# Patient Record
Sex: MALE | Race: WHITE | Employment: FULL TIME | URBAN - METROPOLITAN AREA
[De-identification: names, ages, dates, MRNs, and addresses within clinical notes are randomized per-mention and may not be internally consistent; named-entity substitution may affect disease eponyms.]

---

## 2021-01-15 ENCOUNTER — HOSPITAL ENCOUNTER (EMERGENCY)
Age: 36
Discharge: HOME | End: 2021-01-15
Attending: EMERGENCY MEDICINE
Payer: COMMERCIAL

## 2021-01-15 ENCOUNTER — APPOINTMENT (OUTPATIENT)
Dept: GENERAL RADIOLOGY | Age: 36
End: 2021-01-15
Payer: COMMERCIAL

## 2021-01-15 VITALS
DIASTOLIC BLOOD PRESSURE: 105 MMHG | OXYGEN SATURATION: 97 % | HEIGHT: 75 IN | HEART RATE: 97 BPM | SYSTOLIC BLOOD PRESSURE: 140 MMHG | BODY MASS INDEX: 39.17 KG/M2 | RESPIRATION RATE: 20 BRPM | WEIGHT: 315 LBS | TEMPERATURE: 99.5 F

## 2021-01-15 DIAGNOSIS — U07.1 COVID-19: Primary | ICD-10-CM

## 2021-01-15 DIAGNOSIS — R03.0 ELEVATED BP WITHOUT DIAGNOSIS OF HYPERTENSION: ICD-10-CM

## 2021-01-15 DIAGNOSIS — J18.9 PNEUMONIA DUE TO ORGANISM: ICD-10-CM

## 2021-01-15 LAB
A/G RATIO: 1.1 (ref 1.1–2.2)
ALBUMIN SERPL-MCNC: 3.8 G/DL (ref 3.4–5)
ALP BLD-CCNC: 103 U/L (ref 40–129)
ALT SERPL-CCNC: 28 U/L (ref 10–40)
ANION GAP SERPL CALCULATED.3IONS-SCNC: 8 MMOL/L (ref 3–16)
AST SERPL-CCNC: 29 U/L (ref 15–37)
BASE EXCESS VENOUS: 0.6 MMOL/L (ref -3–3)
BASOPHILS ABSOLUTE: 0 K/UL (ref 0–0.2)
BASOPHILS RELATIVE PERCENT: 0.3 %
BILIRUB SERPL-MCNC: 0.4 MG/DL (ref 0–1)
BUN BLDV-MCNC: 16 MG/DL (ref 7–20)
CALCIUM SERPL-MCNC: 9.3 MG/DL (ref 8.3–10.6)
CARBOXYHEMOGLOBIN: 1.7 % (ref 0–1.5)
CHLORIDE BLD-SCNC: 105 MMOL/L (ref 99–110)
CO2: 27 MMOL/L (ref 21–32)
CREAT SERPL-MCNC: 1.2 MG/DL (ref 0.9–1.3)
EOSINOPHILS ABSOLUTE: 0.1 K/UL (ref 0–0.6)
EOSINOPHILS RELATIVE PERCENT: 1.5 %
GFR AFRICAN AMERICAN: >60
GFR NON-AFRICAN AMERICAN: >60
GLOBULIN: 3.5 G/DL
GLUCOSE BLD-MCNC: 113 MG/DL (ref 70–99)
HCO3 VENOUS: 25.8 MMOL/L (ref 23–29)
HCT VFR BLD CALC: 43.4 % (ref 40.5–52.5)
HEMOGLOBIN: 14.8 G/DL (ref 13.5–17.5)
LACTIC ACID: 0.6 MMOL/L (ref 0.4–2)
LYMPHOCYTES ABSOLUTE: 1.2 K/UL (ref 1–5.1)
LYMPHOCYTES RELATIVE PERCENT: 23.8 %
MCH RBC QN AUTO: 27 PG (ref 26–34)
MCHC RBC AUTO-ENTMCNC: 34.2 G/DL (ref 31–36)
MCV RBC AUTO: 79 FL (ref 80–100)
METHEMOGLOBIN VENOUS: 0.5 %
MONOCYTES ABSOLUTE: 0.8 K/UL (ref 0–1.3)
MONOCYTES RELATIVE PERCENT: 16.7 %
NEUTROPHILS ABSOLUTE: 2.9 K/UL (ref 1.7–7.7)
NEUTROPHILS RELATIVE PERCENT: 57.7 %
O2 CONTENT, VEN: 17 VOL %
O2 SAT, VEN: 76 %
O2 THERAPY: ABNORMAL
PCO2, VEN: 43.1 MMHG (ref 40–50)
PDW BLD-RTO: 13.7 % (ref 12.4–15.4)
PH VENOUS: 7.39 (ref 7.35–7.45)
PLATELET # BLD: 195 K/UL (ref 135–450)
PMV BLD AUTO: 7.4 FL (ref 5–10.5)
PO2, VEN: 39.4 MMHG (ref 25–40)
POTASSIUM SERPL-SCNC: 4.2 MMOL/L (ref 3.5–5.1)
PROCALCITONIN: 0.09 NG/ML (ref 0–0.15)
RBC # BLD: 5.49 M/UL (ref 4.2–5.9)
SODIUM BLD-SCNC: 140 MMOL/L (ref 136–145)
TCO2 CALC VENOUS: 27 MMOL/L
TOTAL PROTEIN: 7.3 G/DL (ref 6.4–8.2)
TROPONIN: <0.01 NG/ML
WBC # BLD: 5 K/UL (ref 4–11)

## 2021-01-15 PROCEDURE — 99283 EMERGENCY DEPT VISIT LOW MDM: CPT

## 2021-01-15 PROCEDURE — 82803 BLOOD GASES ANY COMBINATION: CPT

## 2021-01-15 PROCEDURE — 6370000000 HC RX 637 (ALT 250 FOR IP): Performed by: EMERGENCY MEDICINE

## 2021-01-15 PROCEDURE — 85025 COMPLETE CBC W/AUTO DIFF WBC: CPT

## 2021-01-15 PROCEDURE — 84484 ASSAY OF TROPONIN QUANT: CPT

## 2021-01-15 PROCEDURE — 84145 PROCALCITONIN (PCT): CPT

## 2021-01-15 PROCEDURE — 87040 BLOOD CULTURE FOR BACTERIA: CPT

## 2021-01-15 PROCEDURE — 83605 ASSAY OF LACTIC ACID: CPT

## 2021-01-15 PROCEDURE — 2580000003 HC RX 258: Performed by: EMERGENCY MEDICINE

## 2021-01-15 PROCEDURE — 71045 X-RAY EXAM CHEST 1 VIEW: CPT

## 2021-01-15 PROCEDURE — 80053 COMPREHEN METABOLIC PANEL: CPT

## 2021-01-15 RX ORDER — ASPIRIN 81 MG/1
81 TABLET ORAL DAILY
Qty: 14 TABLET | Refills: 0 | Status: SHIPPED | OUTPATIENT
Start: 2021-01-15

## 2021-01-15 RX ORDER — PREDNISONE 20 MG/1
40 TABLET ORAL DAILY
Qty: 10 TABLET | Refills: 0 | Status: SHIPPED | OUTPATIENT
Start: 2021-01-15 | End: 2021-01-20

## 2021-01-15 RX ORDER — 0.9 % SODIUM CHLORIDE 0.9 %
1000 INTRAVENOUS SOLUTION INTRAVENOUS ONCE
Status: COMPLETED | OUTPATIENT
Start: 2021-01-15 | End: 2021-01-15

## 2021-01-15 RX ORDER — AZITHROMYCIN 250 MG/1
250 TABLET, FILM COATED ORAL SEE ADMIN INSTRUCTIONS
Qty: 6 TABLET | Refills: 0 | Status: SHIPPED | OUTPATIENT
Start: 2021-01-15 | End: 2021-01-20

## 2021-01-15 RX ORDER — ACETAMINOPHEN 325 MG/1
650 TABLET ORAL ONCE
Status: COMPLETED | OUTPATIENT
Start: 2021-01-15 | End: 2021-01-15

## 2021-01-15 RX ADMIN — ACETAMINOPHEN 650 MG: 325 TABLET ORAL at 07:43

## 2021-01-15 RX ADMIN — SODIUM CHLORIDE 1000 ML: 9 INJECTION, SOLUTION INTRAVENOUS at 07:18

## 2021-01-15 ASSESSMENT — ENCOUNTER SYMPTOMS
SHORTNESS OF BREATH: 1
CHEST TIGHTNESS: 0
WHEEZING: 1

## 2021-01-15 ASSESSMENT — PAIN SCALES - GENERAL: PAINLEVEL_OUTOF10: 4

## 2021-01-15 NOTE — ED NOTES
Ambulated pt down [place]. Pt's O2 sat 95-96% on room air and HR was  the entirety of the walk. Pt ambulated with a steady gait without complaints.       Janett [facility]  01/15/21 0836

## 2021-01-15 NOTE — ED PROVIDER NOTES
Emergency Department Provider Note  Location: [address] [place] [address]  ED  1/15/2021     Patient Identification  Jake Campbell is a 28 y.o. male    Chief Complaint  Positive For Covid-19 (Patient comes into ED with c/o shortness of breath after testing positive for COVID yesterday. )      HPI  (History provided by patient)  Cooper Uribe is a 29yo M with PMHx of postivie COVID 19 diagnosis yesterday and morbid obesity who presents to the ED with SOB starting this morning. Pt says he awoke and felt like he was unable to catch his breathe. Pt says he believes he was exposed to Mark from his mother about 10 days ago      I have reviewed the following nursing documentation:  Allergies: No Known Allergies    Past medical history:  has a past medical history of Eosinophilic esophagitis (5913). Past surgical history:  has a past surgical history that includes Upper gastrointestinal endoscopy (4/6/2009). Home medications:   Prior to Admission medications    Medication Sig Start Date End Date Taking? Authorizing Provider   azithromycin (ZITHROMAX) 250 MG tablet Take 1 tablet by mouth See Admin Instructions for 5 days 500mg on day 1 followed by 250mg on days 2 - 5 1/15/21 1/20/21 Yes Michael Mcdowell DO   predniSONE (DELTASONE) 20 MG tablet Take 2 tablets by mouth daily for 5 days 1/15/21 1/20/21 Yes Michael Mcdowell DO   aspirin EC 81 MG EC tablet Take 1 tablet by mouth daily 1/15/21  Yes Michael Mcdowell DO   Esomeprazole Magnesium (NEXIUM PO) Take by mouth    Historical Provider, MD       Social history:  reports that he has never smoked. He has never used smokeless tobacco. He reports that he does not drink alcohol or use drugs.     Family history:    Family History   Problem Relation Age of Onset    Diabetes Mother     High Cholesterol Father     Diabetes Maternal Uncle     Heart Disease Paternal Uncle     Heart Disease Maternal Grandmother     High Blood Pressure Maternal Grandmother     Diabetes Maternal Grandmother     Cancer Maternal Grandfather         through out his body    Heart Disease Paternal Grandmother     Cancer Paternal Grandfather         colon and prostate    Bipolar Disorder Maternal Uncle          ROS  Review of Systems   Constitutional: Negative for chills and fever. Respiratory: Positive for shortness of breath and wheezing. Negative for chest tightness. Cardiovascular: Negative for chest pain. Neurological: Negative for dizziness and numbness. Exam  ED Triage Vitals [01/15/21 0502]   BP Temp Temp src Pulse Resp SpO2 Height Weight   (!) 146/105 99.5 °F (37.5 °C) -- 112 22 96 % 6' 3\" (1.905 m) (!) 380 lb (172.4 kg)       Physical Exam  Constitutional:       Appearance: Normal appearance. He is obese. HENT:      Head: Normocephalic. Cardiovascular:      Rate and Rhythm: Normal rate and regular rhythm. Pulses: Normal pulses. Pulmonary:      Breath sounds: Wheezing present. Comments: Decreased breath sounds LLL  Neurological:      General: No focal deficit present. Mental Status: He is alert. Psychiatric:         Mood and Affect: Mood normal.         Behavior: Behavior normal.         Thought Content: Thought content normal.         Judgment: Judgment normal.           ED Course    ED Medication Orders (From admission, onward)    Start Ordered     Status Ordering Provider    01/15/21 0730 01/15/21 0716  0.9 % sodium chloride bolus  ONCE      Last MAR action: Stopped - by Grady Fowler on 01/15/21 at [address] [facility] Kierra HAYWOOD    01/15/21 0730 01/15/21 0716  acetaminophen (TYLENOL) tablet 650 mg  ONCE      Last MAR action: Given - by Grady Fowler on 01/15/21 at [address], [address] [facility]          EKG  N/A      Radiology  Xr Chest Portable    Result Date: 1/15/2021  EXAMINATION: ONE XRAY VIEW OF THE CHEST 1/15/2021 5:09 am COMPARISON: None.  HISTORY: ORDERING SYSTEM PROVIDED HISTORY: covid positive TECHNOLOGIST PROVIDED HISTORY: Reason for exam:->covid positive Reason for Exam: POSITIVE COVID TEST 1 DAY AGO FINDINGS: Normal heart size and pulmonary vasculature. Faint airspace opacities in the left mid lung adjacent to the left heart border concerning for pneumonia. Right lung appears clear. No pneumothorax. No effusion. Suspected left lung consolidation most compatible with pneumonia.          Labs  Results for orders placed or performed during the hospital encounter of 01/15/21   CBC Auto Differential   Result Value Ref Range    WBC 5.0 4.0 - 11.0 K/uL    RBC 5.49 4.20 - 5.90 M/uL    Hemoglobin 14.8 13.5 - 17.5 g/dL    Hematocrit 43.4 40.5 - 52.5 %    MCV 79.0 (L) 80.0 - 100.0 fL    MCH 27.0 26.0 - 34.0 pg    MCHC 34.2 31.0 - 36.0 g/dL    RDW 13.7 12.4 - 15.4 %    Platelets 551 203 - 243 K/uL    MPV 7.4 5.0 - 10.5 fL    Neutrophils % 57.7 %    Lymphocytes % 23.8 %    Monocytes % 16.7 %    Eosinophils % 1.5 %    Basophils % 0.3 %    Neutrophils Absolute 2.9 1.7 - 7.7 K/uL    Lymphocytes Absolute 1.2 1.0 - 5.1 K/uL    Monocytes Absolute 0.8 0.0 - 1.3 K/uL    Eosinophils Absolute 0.1 0.0 - 0.6 K/uL    Basophils Absolute 0.0 0.0 - 0.2 K/uL   Comprehensive Metabolic Panel   Result Value Ref Range    Sodium 140 136 - 145 mmol/L    Potassium 4.2 3.5 - 5.1 mmol/L    Chloride 105 99 - 110 mmol/L    CO2 27 21 - 32 mmol/L    Anion Gap 8 3 - 16    Glucose 113 (H) 70 - 99 mg/dL    BUN 16 7 - 20 mg/dL    CREATININE 1.2 0.9 - 1.3 mg/dL    GFR Non-African American >60 >60    GFR African American >60 >60    Calcium 9.3 8.3 - 10.6 mg/dL    Total Protein 7.3 6.4 - 8.2 g/dL    Alb 3.8 3.4 - 5.0 g/dL    Albumin/Globulin Ratio 1.1 1.1 - 2.2    Total Bilirubin 0.4 0.0 - 1.0 mg/dL    Alkaline Phosphatase 103 40 - 129 U/L    ALT 28 10 - 40 U/L    AST 29 15 - 37 U/L    Globulin 3.5 g/dL   Lactic Acid, Plasma   Result Value Ref Range    Lactic Acid 0.6 0.4 - 2.0 mmol/L   Troponin   Result Value Ref Range    Troponin <0.01 <0.01 ng/mL   Procalcitonin   Result Value Ref Range    Procalcitonin 0.09 0.00 - 0.15 ng/mL   Blood gas, venous   Result Value Ref Range    pH, Leodan 7.395 7.350 - 7.450    pCO2, [place]odan 43.1 40.0 - 50.0 mmHg    pO2, [place]odan 39.4 25.0 - 40.0 mmHg    HCO3, Venous 25.8 23.0 - 29.0 mmol/L    Base Excess, Leodan 0.6 -3.0 - 3.0 mmol/L    O2 Sat, Leodan 76 Not Established %    Carboxyhemoglobin 1.7 (H) 0.0 - 1.5 %    MetHgb, Leodan 0.5 <1.5 %    TC02 (Calc), Leodan 27 Not Established mmol/L    O2 Content, Leodan 17 Not Established VOL %    O2 Therapy Unknown          MDM  Patient seen and evaluated. Relevant records reviewed. Pt presents with 1 day hx of SOB after waking up this morning. Pt was diagnosed with COVID yesterday after a possible exposure 10 days ago. Pt is morbidly obese but otherwise he denies any other chronic medical conditions. Pt in the mid 90's O2 while ambulating. Pt denies CP. Procalcitonin, lactic acid, troponin, and WBC normal. PE, MI, CHF unlikely. Pt most likely has COVID related symptoms with possible bacterial pneumonia. Pt discharged with zpack, prednisone, aspirin, and home pulse ox. Pt instructed to FU with PCP in 1 week or if symptoms worsen. Clinical Impression:  1. COVID-19    2. Pneumonia due to organism    3. Elevated BP without diagnosis of hypertension          Disposition:  Discharge to home in stable condition. Blood pressure (!) 140/105, pulse 97, temperature 99.5 °F (37.5 °C), resp. rate 20, height 6' 3\" (1.905 m), weight (!) 380 lb (172.4 kg), SpO2 97 %. Patient was given scripts for the following medications. I counseled patient how to take these medications.    Discharge Medication List as of 1/15/2021  9:13 AM      START taking these medications    Details   azithromycin (ZITHROMAX) 250 MG tablet Take 1 tablet by mouth See Admin Instructions for 5 days 500mg on day 1 followed by 250mg on days 2 - 5, Disp-6 tablet, R-0Print      predniSONE (DELTASONE) 20 MG tablet Take 2 tablets by mouth daily for 5 days, Disp-10 tablet, R-0Print      aspirin EC 81 MG EC tablet Take 1 tablet by mouth daily, Disp-14 tablet, R-0Print             Disposition referral (if applicable):  Larry [phone]  [phone]    In 1 week          Total critical care time is 45 minutes, which excludes separately billable procedures and updating family. Time spent is specifically for management of the presenting complaint and symptoms initially, direct bedside care, reevaluation, review of records, and consultation. There was a high probability of clinically significant life-threatening deterioration in the patient's condition, which required my urgent intervention. This chart was generated in part by using Dragon Dictation system and may contain errors related to that system including errors in grammar, punctuation, and spelling, as well as words and phrases that may be inappropriate. If there are any questions or concerns please feel free to contact the dictating provider for clarification.      Emma Awan, DO  [facility] [address] [place] [address]  Resident  01/15/21 9549

## 2021-01-17 ENCOUNTER — CARE COORDINATION (OUTPATIENT)
Dept: CASE MANAGEMENT | Age: 36
End: 2021-01-17

## 2021-01-18 ENCOUNTER — CARE COORDINATION (OUTPATIENT)
Dept: CASE MANAGEMENT | Age: 36
End: 2021-01-18

## 2021-01-18 NOTE — CARE COORDINATION
Patient contacted regarding COVID-19 exposure. Discussed COVID-19 related testing which was available at this time. Test results were positive. Patient informed of results, if available? Yes    Ambulatory Care Manager contacted the patient by telephone to perform post discharge assessment. Call within 2 business days of discharge: Yes. Verified name and  with patient as identifiers. Provided introduction to self, and explanation of the ACM role, and reason for call due to risk factors for infection and/or exposure to COVID-19. Symptoms reviewed with patient who verbalized the following symptoms: cough, chills or shaking, no new symptoms, no worsening symptoms and patient reports sob resolved. Due to no new or worsening symptoms encounter was not routed to provider for escalation. Discussed follow-up appointments. If no appointment was previously scheduled, appointment scheduling offered: patient reports he has already contacted his PCP and they discussed his ED visit/dx and [facility] follow up appointment(s): No future appointments. Non-[facility] follow up appointment(s):     Non-face-to-face services provided:  Obtained and reviewed discharge summary and/or continuity of care documents  Education of patient/family/caregiver/guardian to support self-management-COVID-19/O2 Sat ED d/c instructions  Noted bp elevated during ED visit; patient verbalized awareness bp may be elevated to ED visit/stress  Discussed monitoring bp more frequently and reviewed target bp. Reviewed HTN Epic care instructions and patient agrees to f/u with his PCP>   Advance Care Planning:   Does patient have an Advance Directive:  reviewed and current. Patient has following risk factors of: obesity. ACM reviewed discharge instructions, medical action plan and red flags such as increased shortness of breath, increasing fever and signs of decompensation with patient who verbalized understanding.    Discussed exposure protocols and quarantine with CDC Guidelines What to do if you are sick with coronavirus disease 2019.  Patient was given an opportunity for questions and concerns. The patient agrees to contact the [facility] exposure line [phone], local [facility] department [place] Department [facility]: ([phone]) and PCP office for questions related to their healthcare. ACM provided contact information for future needs. Encouraged patient to view the www.cdc.gov web site to re-enforce information reviewed and for COVID-19 updates  Reviewed and educated patient on any new and changed medications related to discharge diagnosis     Patient/family/caregiver given information for GetWell Loop and agrees to enroll yes  Patient's preferred e-mail: [email or web address]  Patient's preferred phone number: [phone]  Based on Loop alert triggers, patient will be contacted by nurse care manager for worsening symptoms. Pt will be further monitored by COVID Loop Team based on symptoms and risk factors.

## 2021-01-19 LAB
BLOOD CULTURE, ROUTINE: NORMAL
CULTURE, BLOOD 2: NORMAL

## 2021-01-19 NOTE — ED PROVIDER NOTES
I independently performed a history and physical on Cornell Washington. All diagnostic, treatment, and disposition decisions were made by myself in conjunction with the resident below    For further details of [facility] Mc[facility] emergency department encounter, please see Cristobal York DO's note for full details of patient's visit. Patient presents to the emergency department complaining of shortness of breath after testing positive for Covid earlier today. Patient arrives with temp of 99.5, pulse of 112, and O2 sats of 96% upon arrival.  Patient reports that he has had symptoms since early this week and that he only recently tested positive. Patient reports fevers and chills at home. Nonproductive cough noted. Physical exam: General: No acute distress. Head: Normal cephalic/atraumatic. Heart: Mild tachycardia. Normal S1-S2. Lungs: Wheezing noted. No rales, rhonchi. Abdomen: Soft. Extremities: Within normal limits. 1. COVID-19    2. Pneumonia due to organism    3.  Elevated BP without diagnosis of hypertension           Brianne Spencer   01/19/21 9428

## 2021-03-23 ENCOUNTER — IMMUNIZATION (OUTPATIENT)
Dept: PRIMARY CARE CLINIC | Age: 36
End: 2021-03-23
Payer: COMMERCIAL

## 2021-03-23 PROCEDURE — 0011A PR IMM ADMN SARSCOV2 100 MCG/0.5 ML 1ST DOSE: CPT | Performed by: FAMILY MEDICINE

## 2021-03-23 PROCEDURE — 91301 COVID-19, MODERNA VACCINE 100MCG/0.5ML DOSE: CPT | Performed by: FAMILY MEDICINE

## 2021-04-20 ENCOUNTER — IMMUNIZATION (OUTPATIENT)
Dept: PRIMARY CARE CLINIC | Age: 36
End: 2021-04-20
Payer: COMMERCIAL

## 2021-04-20 PROCEDURE — 91301 COVID-19, MODERNA VACCINE 100MCG/0.5ML DOSE: CPT | Performed by: FAMILY MEDICINE

## 2021-04-20 PROCEDURE — 0012A COVID-19, MODERNA VACCINE 100MCG/0.5ML DOSE: CPT | Performed by: FAMILY MEDICINE
